# Patient Record
Sex: MALE | Race: WHITE | HISPANIC OR LATINO | Employment: UNEMPLOYED | URBAN - METROPOLITAN AREA
[De-identification: names, ages, dates, MRNs, and addresses within clinical notes are randomized per-mention and may not be internally consistent; named-entity substitution may affect disease eponyms.]

---

## 2021-09-21 ENCOUNTER — OFFICE VISIT (OUTPATIENT)
Dept: URGENT CARE | Facility: PHYSICIAN GROUP | Age: 46
End: 2021-09-21

## 2021-09-21 VITALS
HEART RATE: 89 BPM | RESPIRATION RATE: 16 BRPM | WEIGHT: 182 LBS | SYSTOLIC BLOOD PRESSURE: 126 MMHG | DIASTOLIC BLOOD PRESSURE: 84 MMHG | TEMPERATURE: 97.8 F | OXYGEN SATURATION: 97 %

## 2021-09-21 DIAGNOSIS — S61.412A LACERATION OF LEFT HAND, FOREIGN BODY PRESENCE UNSPECIFIED, INITIAL ENCOUNTER: ICD-10-CM

## 2021-09-21 PROCEDURE — 12001 RPR S/N/AX/GEN/TRNK 2.5CM/<: CPT | Performed by: PHYSICIAN ASSISTANT

## 2021-09-21 PROCEDURE — 90715 TDAP VACCINE 7 YRS/> IM: CPT | Performed by: PHYSICIAN ASSISTANT

## 2021-09-21 PROCEDURE — 90471 IMMUNIZATION ADMIN: CPT | Performed by: PHYSICIAN ASSISTANT

## 2021-09-21 RX ORDER — MINERAL OIL, PETROLATUM, PHENYLEPHRINE HCL 2.5; 140; 749 MG/G; MG/G; MG/G
OINTMENT TOPICAL
COMMUNITY
Start: 2021-08-28

## 2021-09-21 RX ORDER — ASPIRIN 81 MG/1
81 TABLET, CHEWABLE ORAL DAILY
COMMUNITY

## 2021-09-21 RX ORDER — GLIPIZIDE 10 MG/1
TABLET, FILM COATED, EXTENDED RELEASE ORAL
COMMUNITY
Start: 2021-08-28

## 2021-09-21 RX ORDER — INSULIN HUMAN 100 [IU]/ML
INJECTION, SUSPENSION SUBCUTANEOUS
COMMUNITY
Start: 2021-08-28

## 2021-09-21 ASSESSMENT — ENCOUNTER SYMPTOMS
TINGLING: 0
SENSORY CHANGE: 0
ROS SKIN COMMENTS: LEFT HAND LACERATION
CHILLS: 0
BRUISES/BLEEDS EASILY: 0
FOCAL WEAKNESS: 0
FEVER: 0

## 2021-09-22 NOTE — PROGRESS NOTES
Subjective:     Brenton Markham  is a 45 y.o. male who presents for Hand Laceration (L hand laceration, happened around 3:30)    Assisted by Burkinan / English .    Laceration   The incident occurred 1 to 3 hours ago. The laceration is located on the left hand. The laceration is 2 cm in size. The laceration mechanism was a clean knife. The pain is mild. He reports no foreign bodies present. His tetanus status is out of date.     Patient describes been a few hours status post laceration to left hand.  He states he was cutting zip ties off of a product when his hand slipped and cut left hand.  Denies numbness tingling or weakness.  He cannot recall tetanus status but suspects it may have been 2012 or before.    Review of Systems   Constitutional: Negative for chills and fever.   Skin: Negative for rash.        Left hand laceration   Neurological: Negative for tingling, sensory change and focal weakness.   Endo/Heme/Allergies: Does not bruise/bleed easily.       Medications:    • aspirin Chew  • glipiZIDE SR Tb24  • HumuLIN 70/30 Susp  • Preparation H Oint    Allergies: Patient has no known allergies.    Problem List: Brenton Markham does not have a problem list on file.    Surgical History:  No past surgical history on file.    Past Social Hx: Brenton Markham  reports that he has never smoked. He has never used smokeless tobacco. He reports previous alcohol use. He reports that he does not use drugs.     Past Family Hx:  Brenton Markham family history is not on file.     Problem list, medications, and allergies reviewed by myself today in Epic.     Objective:   /84 (BP Location: Left arm, Patient Position: Sitting, BP Cuff Size: Adult)   Pulse 89   Temp 36.6 °C (97.8 °F) (Temporal)   Resp 16   Wt 82.6 kg (182 lb)   SpO2 97%     Physical Exam  Vitals and nursing note reviewed.   Constitutional:       General: He is not in acute distress.     Appearance: He is  well-developed. He is not diaphoretic.   HENT:      Head: Normocephalic and atraumatic.      Right Ear: External ear normal.      Left Ear: External ear normal.      Nose: Nose normal.   Eyes:      General: No scleral icterus.        Right eye: No discharge.         Left eye: No discharge.      Conjunctiva/sclera: Conjunctivae normal.   Pulmonary:      Effort: Pulmonary effort is normal. No respiratory distress.   Musculoskeletal:         General: Normal range of motion.      Left hand: Laceration present. No swelling or deformity.        Hands:       Cervical back: Neck supple.      Comments: 2 cm, superficial thickness, clean linear laceration left hand, dorsum   Skin:     General: Skin is warm and dry.      Coloration: Skin is not pale.   Neurological:      Mental Status: He is alert and oriented to person, place, and time.      Coordination: Coordination normal.         Procedure: Laceration Repair  -Risks including bleeding, nerve damage, infection, and poor cosmetic outcome discussed. Benefits and alternatives discussed.   -Clean technique with sterile instruments and suture used  -Local anesthesia with 2% lidocaine  -Closed with #3  4-0 Nylon interrupted sutures with good wound approximation  -Polysporin and dressing placed  -Patient tolerated well    TDAP updated    Assessment/Plan:   Assessment      1. Laceration of left hand, foreign body presence unspecified, initial encounter  - Tdap =>6yo IM    Other orders  - glipiZIDE SR (GLUCOTROL) 10 MG TABLET SR 24 HR;  (Patient not taking: Reported on 9/21/2021)  - HUMULIN 70/30 (70-30) 100 UNIT/ML Suspension; INJECT 30 UNITS UNDER THE SKIN IN THE MORNING AND 15 UNITS AT NIGHT  - PREPARATION H 0.25-14-74.9 % Ointment ointment; APPLY RECTALLY AT THE LEVEL OF THE ANUS TWICE DAILY FOR 14 DAYS (Patient not taking: Reported on 9/21/2021)  - aspirin (ASA) 81 MG Chew Tab chewable tablet; Chew 81 mg every day.  -Wound care reviewed  -Return to clinic with lack of  resolution or progression of symptoms.    I have worn an N95 mask, gloves and eye protection for the entire encounter with this patient.     Differential diagnosis, natural history, supportive care, and indications for immediate follow-up discussed.